# Patient Record
Sex: MALE | Race: WHITE | NOT HISPANIC OR LATINO | Employment: UNEMPLOYED | URBAN - NONMETROPOLITAN AREA
[De-identification: names, ages, dates, MRNs, and addresses within clinical notes are randomized per-mention and may not be internally consistent; named-entity substitution may affect disease eponyms.]

---

## 2018-09-24 ENCOUNTER — HOSPITAL ENCOUNTER (EMERGENCY)
Facility: HOSPITAL | Age: 78
Discharge: HOME | End: 2018-09-24
Attending: EMERGENCY MEDICINE | Admitting: EMERGENCY MEDICINE

## 2018-09-24 VITALS
WEIGHT: 185 LBS | HEART RATE: 65 BPM | BODY MASS INDEX: 28.04 KG/M2 | HEIGHT: 68 IN | SYSTOLIC BLOOD PRESSURE: 137 MMHG | RESPIRATION RATE: 18 BRPM | DIASTOLIC BLOOD PRESSURE: 79 MMHG | TEMPERATURE: 97.3 F | OXYGEN SATURATION: 95 %

## 2018-09-24 DIAGNOSIS — T18.128A FOOD IMPACTION OF ESOPHAGUS, INITIAL ENCOUNTER: Primary | ICD-10-CM

## 2018-09-24 PROCEDURE — 99282 EMERGENCY DEPT VISIT SF MDM: CPT

## 2018-09-24 RX ORDER — OMEPRAZOLE 20 MG/1
20 CAPSULE, DELAYED RELEASE ORAL DAILY
Qty: 10 CAPSULE | Refills: 0 | Status: SHIPPED | OUTPATIENT
Start: 2018-09-24

## 2018-09-24 NOTE — ED PROVIDER NOTES
"Subjective   78-year-old white male presents with \"choking\".  Patient states that he was eating [facility] tonight became choked on a piece of chicken.  He said he was not having any trouble breathing but just felt like this got stuck in his throat.  Initially, he was unable to swallow even water.  He said this lasted for about 10 minutes, and he became very concerned and came to the ER.  He's had 2 previous episodes in the past, but has never had an EGD or required emergent scope.  Currently he is asymptomatic.  He reports that he had an x-ray 20 years ago and they told him he had some narrowing.  Currently he is traveling from his home in [place], [place] to [facility].            Review of Systems   All other systems reviewed and are negative.      Past Medical History:   Diagnosis Date   • Hyperlipidemia    • Hypertension        Allergies   Allergen Reactions   • Doxycycline Anaphylaxis       Past Surgical History:   Procedure Laterality Date   • KIDNEY STONE SURGERY         History reviewed. No pertinent family history.    Social History     Social History   • Marital status: Other     Social History Main Topics   • Smoking status: Current Every Day Smoker     Types: Cigarettes   • Alcohol use No   • Drug use: No     Other Topics Concern   • Not on file           Objective   Physical Exam   Constitutional: He is oriented to person, place, and time. He appears well-developed and well-nourished.   HENT:   Head: Normocephalic and atraumatic.   Cardiovascular: Normal rate, regular rhythm and normal heart sounds.  Exam reveals no gallop and no friction rub.    No murmur heard.  Pulmonary/Chest: Effort normal and breath sounds normal. No respiratory distress. He has no wheezes. He has no rales.   Abdominal: Soft. Bowel sounds are normal. He exhibits no distension. There is no tenderness.   Musculoskeletal: Normal range of motion.   Neurological: He is alert and oriented to person, place, and time.   Skin: Skin is warm and " dry.   Psychiatric: He has a normal mood and affect.   Nursing note and vitals reviewed.      Procedures           ED Course  ED Course as of Sep 24 1536   Mon Sep 24, 2018   1524 Asymptomatic at this time.  I recommended follow-up with GI.  He states he will do this when he returns home to follow-up.  No indication for emergent endoscopy or admission at this time.  Have recommended Prilosec and given him a prescription for this.  He is reluctant to take it.  Have discussed the risk and benefits of taking this.  His daughter is with him and also encouraged him to take the medicine as prescribed.  He states that he doesn't want to start anything new since he is not at home.  I told him that my recommendation would be to take his medication but ultimately the decision is his.  [BC]      ED Course User Index  [BC] Hunter Bailey MD                  MDM  Number of Diagnoses or Management Options  Food impaction of esophagus, initial encounter:   Risk of Complications, Morbidity, and/or Mortality  Presenting problems: high  Diagnostic procedures: minimal  Management options: moderate          Final diagnoses:   Food impaction of esophagus, initial encounter            Hunter Bailey MD  09/24/18 1536